# Patient Record
Sex: FEMALE | Race: WHITE | NOT HISPANIC OR LATINO | URBAN - METROPOLITAN AREA
[De-identification: names, ages, dates, MRNs, and addresses within clinical notes are randomized per-mention and may not be internally consistent; named-entity substitution may affect disease eponyms.]

---

## 2018-02-12 PROBLEM — 429969003 FAMILY HISTORY OF POLYP OF COLON: Status: ACTIVE | Noted: 2018-02-12

## 2022-04-30 ENCOUNTER — TELEPHONE ENCOUNTER (OUTPATIENT)
Dept: URBAN - METROPOLITAN AREA CLINIC 121 | Facility: CLINIC | Age: 72
End: 2022-04-30

## 2022-05-01 ENCOUNTER — TELEPHONE ENCOUNTER (OUTPATIENT)
Dept: URBAN - METROPOLITAN AREA CLINIC 121 | Facility: CLINIC | Age: 72
End: 2022-05-01

## 2022-05-01 RX ORDER — FAMCICLOVIR 250 MG/1
TABLET, FILM COATED ORAL
Status: ACTIVE | COMMUNITY
Start: 2018-02-12

## 2022-05-01 RX ORDER — ESTRADIOL 0.1 MG/D
FILM, EXTENDED RELEASE TRANSDERMAL
Status: ACTIVE | COMMUNITY
Start: 2018-02-12

## 2022-05-01 RX ORDER — FAMCICLOVIR 125 MG/1
TABLET, FILM COATED ORAL
Status: ACTIVE | COMMUNITY
Start: 2018-02-12

## 2023-04-10 ENCOUNTER — TELEPHONE ENCOUNTER (OUTPATIENT)
Dept: URBAN - METROPOLITAN AREA CLINIC 6 | Facility: CLINIC | Age: 73
End: 2023-04-10

## 2023-04-12 ENCOUNTER — TELEPHONE ENCOUNTER (OUTPATIENT)
Dept: URBAN - METROPOLITAN AREA CLINIC 27 | Facility: CLINIC | Age: 73
End: 2023-04-12

## 2023-05-08 ENCOUNTER — DASHBOARD ENCOUNTERS (OUTPATIENT)
Age: 73
End: 2023-05-08

## 2023-05-08 ENCOUNTER — OFFICE VISIT (OUTPATIENT)
Dept: URBAN - METROPOLITAN AREA CLINIC 27 | Facility: CLINIC | Age: 73
End: 2023-05-08
Payer: MEDICARE

## 2023-05-08 VITALS
WEIGHT: 165 LBS | HEIGHT: 70 IN | SYSTOLIC BLOOD PRESSURE: 133 MMHG | BODY MASS INDEX: 23.62 KG/M2 | RESPIRATION RATE: 17 BRPM | HEART RATE: 71 BPM | DIASTOLIC BLOOD PRESSURE: 80 MMHG

## 2023-05-08 DIAGNOSIS — Z86.010 PERSONAL HISTORY OF COLONIC POLYPS: ICD-10-CM

## 2023-05-08 DIAGNOSIS — K58.1 IRRITABLE BOWEL SYNDROME WITH CONSTIPATION: ICD-10-CM

## 2023-05-08 DIAGNOSIS — Z80.0 FAMILY HISTORY OF COLON CANCER: ICD-10-CM

## 2023-05-08 PROBLEM — 312824007: Status: ACTIVE | Noted: 2023-05-08

## 2023-05-08 PROBLEM — 440630006: Status: ACTIVE | Noted: 2023-05-08

## 2023-05-08 PROBLEM — 428283002: Status: ACTIVE | Noted: 2023-05-08

## 2023-05-08 PROCEDURE — 99204 OFFICE O/P NEW MOD 45 MIN: CPT | Performed by: PHYSICIAN ASSISTANT

## 2023-05-08 RX ORDER — ESTRADIOL 0.1 MG/D
FILM, EXTENDED RELEASE TRANSDERMAL
Status: DISCONTINUED | COMMUNITY
Start: 2018-02-12

## 2023-05-08 RX ORDER — FAMCICLOVIR 250 MG/1
TABLET, FILM COATED ORAL
Status: DISCONTINUED | COMMUNITY
Start: 2018-02-12

## 2023-05-08 RX ORDER — FAMCICLOVIR 125 MG/1
TABLET, FILM COATED ORAL
Status: DISCONTINUED | COMMUNITY
Start: 2018-02-12

## 2023-05-08 NOTE — HPI-ZZZTODAY'S VISIT
Ms. Jeffries is a 72-year-old female patient of Dr. Batres presenting for surveillance colonoscopy. She has h/o IBS with constipation. Sx are sporadic, triggered by stress and anxiety. She takes Miralax and eats prunes intermittently. She has tried Miralax on a daily basis in the past which helps, though she still does not feel like she empties completely. No rectal bleeding. Recent labs with her physical exam were apparently normal. . Colonoscopy 2018:Diverticulosis, 1 small adenomatous polyp; recall 5 years . FH: Father with CRC in his 40s

## 2023-06-01 ENCOUNTER — OFFICE VISIT (OUTPATIENT)
Dept: URBAN - METROPOLITAN AREA SURGERY CENTER 7 | Facility: SURGERY CENTER | Age: 73
End: 2023-06-01
Payer: MEDICARE

## 2023-06-01 ENCOUNTER — CLAIMS CREATED FROM THE CLAIM WINDOW (OUTPATIENT)
Dept: URBAN - METROPOLITAN AREA CLINIC 4 | Facility: CLINIC | Age: 73
End: 2023-06-01
Payer: MEDICARE

## 2023-06-01 DIAGNOSIS — Z86.010 ADENOMAS PERSONAL HISTORY OF COLONIC POLYPS: ICD-10-CM

## 2023-06-01 DIAGNOSIS — D12.4 ADENOMA OF DESCENDING COLON: ICD-10-CM

## 2023-06-01 DIAGNOSIS — D12.4 BENIGN NEOPLASM OF DESCENDING COLON: ICD-10-CM

## 2023-06-01 PROCEDURE — G8907 PT DOC NO EVENTS ON DISCHARG: HCPCS | Performed by: CLINIC/CENTER

## 2023-06-01 PROCEDURE — 88305 TISSUE EXAM BY PATHOLOGIST: CPT | Performed by: PATHOLOGY

## 2023-06-01 PROCEDURE — 45385 COLONOSCOPY W/LESION REMOVAL: CPT | Performed by: CLINIC/CENTER

## 2023-06-01 PROCEDURE — 45385 COLONOSCOPY W/LESION REMOVAL: CPT | Performed by: INTERNAL MEDICINE

## 2023-06-01 PROCEDURE — G8907 PT DOC NO EVENTS ON DISCHARG: HCPCS | Performed by: INTERNAL MEDICINE
